# Patient Record
Sex: FEMALE | Race: WHITE | NOT HISPANIC OR LATINO | Employment: FULL TIME | URBAN - METROPOLITAN AREA
[De-identification: names, ages, dates, MRNs, and addresses within clinical notes are randomized per-mention and may not be internally consistent; named-entity substitution may affect disease eponyms.]

---

## 2022-08-07 ENCOUNTER — HOSPITAL ENCOUNTER (EMERGENCY)
Facility: CLINIC | Age: 27
Discharge: HOME | End: 2022-08-07
Attending: EMERGENCY MEDICINE | Admitting: EMERGENCY MEDICINE
Payer: COMMERCIAL

## 2022-08-07 VITALS
WEIGHT: 120 LBS | SYSTOLIC BLOOD PRESSURE: 109 MMHG | RESPIRATION RATE: 18 BRPM | TEMPERATURE: 97.9 F | BODY MASS INDEX: 23.56 KG/M2 | HEIGHT: 60 IN | DIASTOLIC BLOOD PRESSURE: 67 MMHG | OXYGEN SATURATION: 97 % | HEART RATE: 84 BPM

## 2022-08-07 DIAGNOSIS — N75.0 BARTHOLIN'S CYST: ICD-10-CM

## 2022-08-07 DIAGNOSIS — L03.90 CELLULITIS, UNSPECIFIED CELLULITIS SITE: ICD-10-CM

## 2022-08-07 LAB
BASOPHILS # BLD AUTO: 0.1 10E3/UL (ref 0–0.2)
BASOPHILS NFR BLD AUTO: 1 %
EOSINOPHIL # BLD AUTO: 0.2 10E3/UL (ref 0–0.7)
EOSINOPHIL NFR BLD AUTO: 1 %
ERYTHROCYTE [DISTWIDTH] IN BLOOD BY AUTOMATED COUNT: 13.2 % (ref 10–15)
HCT VFR BLD AUTO: 39.1 % (ref 35–47)
HGB BLD-MCNC: 13 G/DL (ref 11.7–15.7)
IMM GRANULOCYTES # BLD: 0.1 10E3/UL
IMM GRANULOCYTES NFR BLD: 1 %
LYMPHOCYTES # BLD AUTO: 1.7 10E3/UL (ref 0.8–5.3)
LYMPHOCYTES NFR BLD AUTO: 16 %
MCH RBC QN AUTO: 29.3 PG (ref 26.5–33)
MCHC RBC AUTO-ENTMCNC: 33.2 G/DL (ref 31.5–36.5)
MCV RBC AUTO: 88 FL (ref 78–100)
MONOCYTES # BLD AUTO: 0.4 10E3/UL (ref 0–1.3)
MONOCYTES NFR BLD AUTO: 4 %
NEUTROPHILS # BLD AUTO: 8.4 10E3/UL (ref 1.6–8.3)
NEUTROPHILS NFR BLD AUTO: 77 %
NRBC # BLD AUTO: 0 10E3/UL
NRBC BLD AUTO-RTO: 0 /100
PLATELET # BLD AUTO: 403 10E3/UL (ref 150–450)
RBC # BLD AUTO: 4.44 10E6/UL (ref 3.8–5.2)
WBC # BLD AUTO: 10.8 10E3/UL (ref 4–11)

## 2022-08-07 PROCEDURE — 36415 COLL VENOUS BLD VENIPUNCTURE: CPT | Performed by: EMERGENCY MEDICINE

## 2022-08-07 PROCEDURE — 56420 I&D BARTHOLINS GLAND ABSCESS: CPT

## 2022-08-07 PROCEDURE — 87070 CULTURE OTHR SPECIMN AEROBIC: CPT | Performed by: EMERGENCY MEDICINE

## 2022-08-07 PROCEDURE — 250N000011 HC RX IP 250 OP 636: Performed by: EMERGENCY MEDICINE

## 2022-08-07 PROCEDURE — 96374 THER/PROPH/DIAG INJ IV PUSH: CPT

## 2022-08-07 PROCEDURE — 87077 CULTURE AEROBIC IDENTIFY: CPT | Mod: 91 | Performed by: EMERGENCY MEDICINE

## 2022-08-07 PROCEDURE — 99285 EMERGENCY DEPT VISIT HI MDM: CPT | Mod: 25

## 2022-08-07 PROCEDURE — 85025 COMPLETE CBC W/AUTO DIFF WBC: CPT | Performed by: EMERGENCY MEDICINE

## 2022-08-07 RX ORDER — FENTANYL CITRATE 50 UG/ML
50 INJECTION, SOLUTION INTRAMUSCULAR; INTRAVENOUS ONCE
Status: COMPLETED | OUTPATIENT
Start: 2022-08-07 | End: 2022-08-07

## 2022-08-07 RX ORDER — CEPHALEXIN 500 MG/1
500 CAPSULE ORAL 4 TIMES DAILY
Qty: 28 CAPSULE | Refills: 0 | Status: SHIPPED | OUTPATIENT
Start: 2022-08-07 | End: 2022-08-14

## 2022-08-07 RX ADMIN — FENTANYL CITRATE 50 MCG: 50 INJECTION, SOLUTION INTRAMUSCULAR; INTRAVENOUS at 20:54

## 2022-08-07 ASSESSMENT — ENCOUNTER SYMPTOMS
NAUSEA: 0
CHILLS: 0
DIAPHORESIS: 0
ABDOMINAL PAIN: 0
DIARRHEA: 0
DYSURIA: 0
VOMITING: 0
FEVER: 0

## 2022-08-08 NOTE — CONSULTS
CONSULTATION - GYN    NAME: Tatyana Akbar   : 1995   MRN: [id number]      [facility]    ADMISSION DATE: 2022    PCP:  No Ref-Primary, Physician     CHIEF COMPLAINT: Vulvar pain/swelling, concern for abscess    HPI: I have been requested by Dr Garcia to evaluate Tatyana Akbar for vulvar swelling and concern for abscess. Patient is a 27 year old female, she presented to the ED for vulvar pain worsening over the past 4 days.  She is able to void and empty her bladder.      She recently moved to this area, had previously followed with an OB/GYN but has not established with one locally.      PAST MEDICAL HISTORY:  No past medical history on file.    PAST SURGICAL HISTORY:  No past surgical history on file.    SOCIAL HISTORY:  Social History     Socioeconomic History     Marital status: Single     Spouse name: Not on file     Number of children: Not on file     Years of education: Not on file     Highest education level: Not on file   Occupational History     Not on file   Tobacco Use     Smoking status: Not on file     Smokeless tobacco: Not on file   Substance and Sexual Activity     Alcohol use: Not on file     Drug use: Not on file     Sexual activity: Not on file   Other Topics Concern     Not on file   Social History Narrative     Not on file     Social Determinants of Health     Financial Resource Strain: Not on file   Food Insecurity: Not on file   Transportation Needs: Not on file   Physical Activity: Not on file   Stress: Not on file   Social Connections: Not on file   Intimate Partner Violence: Not on file   Housing Stability: Not on file       MEDICATIONS:  No current facility-administered medications for this encounter.     Current Outpatient Medications   Medication     cephALEXin (KEFLEX) 500 MG capsule       ALLERGIES:  No Known Allergies    REVIEW OF SYSTEMS    Negative except what is stated in the HPI    PHYSICAL EXAM:  /67   Pulse 84   Temp 97.9  F (36.6  C)  (Temporal)   Resp 18   Ht 1.524 m (5')   Wt 54.4 kg (120 lb)   LMP 08/07/2022   SpO2 97%   BMI 23.44 kg/m     General Appearance: Alert, appropriate appearance for age. No acute distress,   HEENT : Grossly normal  Chest/Respiratory: Normal respirations.   Cardiovascular: Regular rate and rhythm   Pelvic Exam Female: external exam only, right labia is swollen but no area of fluctuance, enlarged area of fluctuance in area of right bartholin gland, exquisitely tender to palpatoin  Skin: no rash or abnormalities,   Neurologic: Normal gait and speech   Psychiatric: Alert and oriented, appropriate affect.    LABS  Lab Results   Component Value Date    HGB 13.0 08/07/2022     Bartholin I&D Procedure:  Exam performed with findings noted above.  Patient verbally agreed to I&D.  Area was swabbed with betadine and injected with 2 ml of local anesthesia, small stab incision made with scalpel with copious drainage of purulent discharge.  Culture was obtained.  Incision was made in an accessible area of the bartholin gland abscess, exam was limited secondary to patient pain.  Incision was adequate for drainage.    IMPRESSION:  27 year old  Female, No obstetric history on file. with Bartholin gland abscess    RECOMMENDATIONS:  -I&D done tonight at bedside.  Incision was adequate to facilitate drainage but exam limited by patient discomfort.  Word catheter not inserted tonight as incision was somewhat lateral and not optimal for word placement.   -Recommended sitz baths at home to encourage drainage.    -Recommended she follow-up with [facility] within the next 2-3 days for re-evaluation.  Discussed we can re-evaluate at that time, if additional I&D indicated and can re-assess for word catheter placement at that time.    -Recommended oral antibiotics given ongoing swelling/induration in the surrounding labia.    -Reviewed warning signs, advised to call clinic if she notices increasing size of the swelling or  worsening pain    Total time 45 minutes including chart review, time with patient and documentation on date of encounter    Thank you for allowing us to participate in the care of this patient.  Please contact us with any questions/concerns.    Sandy Branch MD

## 2022-08-08 NOTE — ED PROVIDER NOTES
EMERGENCY DEPARTMENT ENCOUNTER      NAME: Tatyana Akbar  AGE: 27 year old female  YOB: 1995  MRN: [id number]  EVALUATION DATE & TIME: 8/7/2022  8:14 PM    PCP: No primary care provider on file.    ED PROVIDER: Martha Garcia MD      Chief Complaint   Patient presents with     Vaginal Problem         FINAL IMPRESSION:  1. Bartholin's cyst    2. Cellulitis, unspecified cellulitis site          ED COURSE & MEDICAL DECISION MAKING:    Pertinent Labs & Imaging studies reviewed. (See chart for details)  27 year old female presents to the Emergency Department for evaluation of vaginal swelling and tenderness.  The symptoms started 4 days ago and she has noticed increased swelling and pain to the point where she cannot walk without having pain.  She denies any constitutional symptoms.  She denies any history of skin infections.  On my evaluation, she has an area of fluctuance on the right labia majora, with associated erythema and edema of the right labia majora.  I consulted OB, Dr. Branch was able to come down and see the patient at the bedside and incise and drain the abscess.  She will plan to have the patient follow-up with them in clinic.  We recommended initiating patient on antibiotics.  This was done.  Patient will discharge and follow-up in clinic with OB.  Patient is comfortable with this plan.    8:19PM:I met with the patient to gather history and to perform my initial exam. We discussed plans for the ED course, including diagnostic testing and treatment.   8:35 PM I paged OBGYN  8:40 PM I spoke with OBGYN  9:00 PM OBGYN will drain the cyst.   9:30 PM Dr. Britta Branch from OBGYN is draining the cyst.   9:41 PM I marked the patient ready for discharge.       At the conclusion of the encounter I discussed the results of all of the tests and the disposition. The questions were answered. The patient or family acknowledged understanding and was agreeable with the care plan.       MEDICATIONS  GIVEN IN THE EMERGENCY:  Medications   fentaNYL (PF) (SUBLIMAZE) injection 50 mcg (50 mcg Intravenous Given 8/7/22 2054)       NEW PRESCRIPTIONS STARTED AT TODAY'S ER VISIT  New Prescriptions    CEPHALEXIN (KEFLEX) 500 MG CAPSULE    Take 1 capsule (500 mg) by mouth 4 times daily for 7 days          =================================================================    HPI    Patient information was obtained from: Patient     Use of : N/A        Tatyana Akbar is a 27 year old female with a limited pertinent history who presents to this ED via private vehicle for evaluation of vaginal problem.     Patient reports right sided vaginal swelling since 8/4. On 8/5 the swelling became worse and it was painful to sit. She attempted to take warm baths and compresses with Advil but found little relief. Today, the swelling has increased and it is too painful for her to sleep. She describes the pain as aching and burning. Patient add she did have sexual intercourse on 8/3.     Patient reports she is on her period and is not able to insert a tampon due to swelling. She notes her period does not flow unless she is attempting to use the bathroom.     Of note, patient has a history of epilepsy but has not been on medication for five years. This afternoon as she was trying to get out of the bath she believes she had a syncopal episode or seizure.     Denies fever, chills, sweats, nausea, vomiting, diarrhea, abdominal pain, vaginal bleeding or discharge, and dysuria. Denies history of infection.       REVIEW OF SYSTEMS   Review of Systems   Constitutional: Negative for chills, diaphoresis and fever.   Gastrointestinal: Negative for abdominal pain, diarrhea, nausea and vomiting.   Genitourinary: Negative for decreased urine volume, dysuria and vaginal discharge.        Positive for right sided vaginal swelling.    All other systems reviewed and are negative.       PAST MEDICAL HISTORY:  No past medical history on  file.    PAST SURGICAL HISTORY:  No past surgical history on file.        CURRENT MEDICATIONS:    cephALEXin (KEFLEX) 500 MG capsule        ALLERGIES:  No Known Allergies    FAMILY HISTORY:  No family history on file.    SOCIAL HISTORY:        VITALS:  /67   Pulse 89   Temp 97.9  F (36.6  C) (Temporal)   Resp 18   Ht 1.524 m (5')   Wt 54.4 kg (120 lb)   LMP 08/07/2022   SpO2 97%   BMI 23.44 kg/m      PHYSICAL EXAM    Constitutional: Well developed, Well nourished, NAD  HENT: Normocephalic, Atraumatic, Bilateral external ears normal, Oropharynx normal, mucous membranes moist, Nose normal.   Neck- Normal range of motion, No tenderness, Supple, No stridor.  Eyes: PERRL, EOMI, Conjunctiva normal, No discharge.   Respiratory: Normal breath sounds, No respiratory distress  Cardiovascular: Normal heart rate, Regular rhythm  GI: Bowel sounds normal, Soft, No tenderness,   Musculoskeletal: No edema. Good range of motion in all major joints. No tenderness to palpation or major deformities noted.   Integument: Warm, Dry, No erythema, No rash  : Right labia majora is erythematous, edematous with an area of fluctuance at the posterior aspect.   Neurologic: Alert & oriented x 3, Normal motor function, Normal sensory function, No focal deficits noted. Normal gait.   Psychiatric: Affect normal, Judgment normal, Mood normal.     I, Rach Velazquez, am serving as a scribe to document services personally performed by Martha Garcia, based on my observation and the provider's statements to me. I, Martha Garcia MD, attest that Rach Velazquez is acting in a scribe capacity, has observed my performance of the services and has documented them in accordance with my direction.    Martha Garcia MD  Emergency Medicine  [facility] EMERGENCY ROOM  [address] [address] [phone]       Martha Garcia MD  08/07/22 2985

## 2022-08-08 NOTE — ED TRIAGE NOTES
Pt presents with R labia swelling and 10/10 pain. Pt reports pain started on Thursday. ABCs intact.      Triage Assessment     Row Name 08/07/22 2013       Triage Assessment (Adult)    Airway WDL WDL       Respiratory WDL    Respiratory WDL WDL       Skin Circulation/Temperature WDL    Skin Circulation/Temperature WDL WDL       Cardiac WDL    Cardiac WDL WDL       Peripheral/Neurovascular WDL    Peripheral Neurovascular WDL WDL       Cognitive/Neuro/Behavioral WDL    Cognitive/Neuro/Behavioral WDL WDL

## 2022-08-11 LAB
BACTERIA ABSC ANAEROBE+AEROBE CULT: ABNORMAL

## 2022-10-03 ENCOUNTER — HEALTH MAINTENANCE LETTER (OUTPATIENT)
Age: 27
End: 2022-10-03

## 2023-02-18 ENCOUNTER — HOSPITAL ENCOUNTER (EMERGENCY)
Facility: CLINIC | Age: 28
Discharge: HOME | End: 2023-02-18
Attending: EMERGENCY MEDICINE | Admitting: EMERGENCY MEDICINE
Payer: COMMERCIAL

## 2023-02-18 VITALS
HEART RATE: 102 BPM | BODY MASS INDEX: 23.56 KG/M2 | SYSTOLIC BLOOD PRESSURE: 122 MMHG | DIASTOLIC BLOOD PRESSURE: 77 MMHG | HEIGHT: 60 IN | RESPIRATION RATE: 18 BRPM | WEIGHT: 120 LBS | TEMPERATURE: 97.4 F | OXYGEN SATURATION: 98 %

## 2023-02-18 DIAGNOSIS — N75.1 ABSCESS OF RIGHT BARTHOLIN'S GLAND: ICD-10-CM

## 2023-02-18 PROBLEM — G40.909 EPILEPTIC SEIZURE (H): Status: ACTIVE | Noted: 2023-02-18

## 2023-02-18 PROBLEM — Z86.19 HISTORY OF VARICELLA INFECTION: Status: ACTIVE | Noted: 2023-02-18

## 2023-02-18 PROBLEM — Z97.5 IUD (INTRAUTERINE DEVICE) IN PLACE: Status: ACTIVE | Noted: 2017-08-14

## 2023-02-18 PROBLEM — F33.0 DEPRESSION, MAJOR, RECURRENT, MILD (H): Status: ACTIVE | Noted: 2023-02-18

## 2023-02-18 PROCEDURE — 99283 EMERGENCY DEPT VISIT LOW MDM: CPT

## 2023-02-18 PROCEDURE — 10060 I&D ABSCESS SIMPLE/SINGLE: CPT

## 2023-02-18 PROCEDURE — 250N000013 HC RX MED GY IP 250 OP 250 PS 637: Performed by: EMERGENCY MEDICINE

## 2023-02-18 RX ORDER — CEPHALEXIN 500 MG/1
500 CAPSULE ORAL 4 TIMES DAILY
Qty: 28 CAPSULE | Refills: 0 | Status: SHIPPED | OUTPATIENT
Start: 2023-02-18 | End: 2023-02-25

## 2023-02-18 RX ORDER — CEPHALEXIN 500 MG/1
500 CAPSULE ORAL ONCE
Status: COMPLETED | OUTPATIENT
Start: 2023-02-18 | End: 2023-02-18

## 2023-02-18 RX ADMIN — CEPHALEXIN 500 MG: 500 CAPSULE ORAL at 17:08

## 2023-02-18 ASSESSMENT — ACTIVITIES OF DAILY LIVING (ADL)
ADLS_ACUITY_SCORE: 35
ADLS_ACUITY_SCORE: 33

## 2023-02-18 NOTE — DISCHARGE INSTRUCTIONS
I recommend 600 mg of ibuprofen every 6 hours for the next couple of days, then only as needed.  Make sure you keep up with your Keflex.    Antibiotics take about 24 hours to kick in.  That is when the inflammation and swelling and redness stops spreading.  If you notice that your symptoms are getting worse after 24 hours, please reach out to gynecology or, care for drainage

## 2023-02-18 NOTE — ED PROVIDER NOTES
EMERGENCY DEPARTMENT ENCOUNTER      NAME: Tatyana Akbar  AGE: 27 year old female  YOB: 1995  MRN: [id number]  EVALUATION DATE & TIME: 2/18/2023  2:30 PM    PCP: No Ref-Primary, Physician    ED PROVIDER: Marino Zimmerman M.D.      Chief Complaint   Patient presents with     Cyst         FINAL IMPRESSION:  1. Abscess of right Bartholin's gland          ED COURSE & MEDICAL DECISION MAKING:    Pertinent Labs & Imaging studies reviewed. (See chart for details)  ED Course as of 02/18/23 2224   Sat Feb 18, 2023   1528 Patient is a 27-year-old woman here with pain to the right side of her vagina consistent with prior Bartholin gland cyst infection.  She had successful I&D here couple months ago, also was treated with a course of antibiotics.  Plan to get a female chaperone for full evaluation, possible I&D   1602 2 small incisions were made in the right lower labia at area of maximal fluctuance/edema.  There was some bloody drainage, no obvious purulent output.  Patient tolerated the procedure well.  Given first dose of Keflex here, discharged with remainder, we discussed return precautions.  She has gynecology follow-up for the next couple of weeks.       Additional ED Course Timestamps:  3:25 PM introduced myself to the patient, obtained patient history, performed a physical exam, and discussed plan for ED workup including potential diagnostic laboratory/imaging studies and interventions.    3:50 PM I&D procedure performed with female chaperone present.    4:03 PM Rechecked and updated the patient. We discussed the plan for discharge and the patient is agreeable. Reviewed supportive cares, symptomatic treatment, outpatient follow up, and reasons to return to the Emergency Department. Patient to be discharged by ED RN.     Medical Decision Making    History:    Supplemental history from: N/A    External Record(s) reviewed: Inpatient Record:   and Outpatient Record:      Work Up:    Chart documentation  includes differential considered and any EKGs or imaging independently interpreted by provider, where specified.    In additional to work up documented, I considered the following work up: Documented in chart, if applicable.    External consultation:    Discussion of management with another provider: Documented in chart, if applicable    Complicating factors:    Care impacted by chronic illness: Other: Hypothryoidism    Care affected by social determinants of health: N/A    Disposition considerations: Discharge. I prescribed additional prescription strength medication(s) as charted. N/A.        At the conclusion of the encounter I discussed the results of all of the tests and the disposition. The questions were answered. The patient or family acknowledged understanding and was agreeable with the care plan.       MEDICATIONS GIVEN IN THE EMERGENCY:  Medications   cephALEXin (KEFLEX) capsule 500 mg (500 mg Oral Given 2/18/23 1708)         NEW PRESCRIPTIONS STARTED AT TODAY'S ER VISIT  Discharge Medication List as of 2/18/2023  5:09 PM      START taking these medications    Details   cephALEXin (KEFLEX) 500 MG capsule Take 1 capsule (500 mg) by mouth 4 times daily for 7 days, Disp-28 capsule, R-0, E-Prescribe                =================================================================    HPI    Patient information was obtained from: patient    Use of : N/A      Tatyana Akbar is a 27 year old female with a pertinent history of Bartholin's cyst, cellulitis, and s/p IUD placement who presents to this ED for evaluation of vaginal pain.    Per patient, she was seen here a few months ago for a Bartholin's cyst on her right side. An I&D was performed. She was placed on antibiotics which alleviated her symptoms completely. She did not experience pain.    Today, she again is experiencing right-sided vaginal pain. She believes it is a Bartholin's cyst again.    She denies fever, chills, or skin  problems.    Patient is following with a gynecologist, and she has an appointment scheduled for March; however, the pain became unbearable today, prompting her to come to the ED.    Patient is on medications for hypothyroidism.       REVIEW OF SYSTEMS   Review of Systems   All other systems reviewed and are negative.  See HPI documentation.    VITALS:  /77   Pulse 102   Temp 97.4  F (36.3  C) (Temporal)   Resp 18   Ht 1.524 m (5')   Wt 54.4 kg (120 lb)   SpO2 98%   BMI 23.44 kg/m      PHYSICAL EXAM    Constitutional: Well developed, well nourished. Comfortable appearing.  HENT: Normocephalic, atraumatic, mucous membranes moist, nose normal  Eyes: PERRL, EOMI, conjunctiva normal, no discharge.  Neck- Supple, gross ROM intact.   Respiratory: Normal work of breathing, normal rate, speaks in full sentences  : Edema and tenderness to right lower labia.  Cardiovascular: Normal heart rate  Musculoskeletal: Moving all 4 extremities intentionally and without pain.  Neurologic: Alert & oriented x 3, cranial nerves grossly intact. Normal gross coordination  Psychiatric: Affect normal, cooperative.    PROCEDURES:   PROCEDURE: Incision and Drainage   INDICATIONS: Localized abscess   PROCEDURE PROVIDER: Dr Ramón Zimmerman   SITE: Right lower labia   MEDICATION: 3 mLs of 1% Lidocaine with epinephrine   NOTE: The area was prepped with betadine.  Local anesthetic was injected subcutaneously with anesthesia effects demonstrated prior to proceeding.  The area of maximal fluctuance was opened with a # 11 Blade ([place]) using a small incision.  Some bloody discharge was expressed, but not purulence. A second small incision was made inferiorly with similar results.     COMPLEXITY: Simple    Simple = single, furuncle, paronychia, superficial  Complex = multiple or abscess requiring probing, loculations, packing placement   COMPLICATIONS: Patient tolerated procedure well, without complication         Peg SANDERS  Josie am serving as a scribe to document services personally performed by Dr. Marino Zimmerman based on my observation and the provider's statements to me. I, Marino Zimmerman MD attest that Peg Galindo is acting in a scribe capacity, has observed my performance of the services and has documented them in accordance with my direction.    Marino Zimmerman M.D.  Emergency Medicine  [facility] EMERGENCY ROOM  [address] [id number] [phone]  Dept: [phone]     Marino Zimmerman MD  02/18/23 3306

## 2023-05-31 ENCOUNTER — APPOINTMENT (OUTPATIENT)
Dept: CT IMAGING | Facility: CLINIC | Age: 28
End: 2023-05-31
Attending: EMERGENCY MEDICINE
Payer: COMMERCIAL

## 2023-05-31 ENCOUNTER — HOSPITAL ENCOUNTER (EMERGENCY)
Facility: CLINIC | Age: 28
Discharge: HOME | End: 2023-05-31
Attending: EMERGENCY MEDICINE | Admitting: EMERGENCY MEDICINE
Payer: COMMERCIAL

## 2023-05-31 ENCOUNTER — APPOINTMENT (OUTPATIENT)
Dept: MRI IMAGING | Facility: CLINIC | Age: 28
End: 2023-05-31
Attending: EMERGENCY MEDICINE
Payer: COMMERCIAL

## 2023-05-31 VITALS
WEIGHT: 117.9 LBS | TEMPERATURE: 97.3 F | OXYGEN SATURATION: 97 % | BODY MASS INDEX: 23.15 KG/M2 | DIASTOLIC BLOOD PRESSURE: 62 MMHG | HEART RATE: 69 BPM | HEIGHT: 60 IN | SYSTOLIC BLOOD PRESSURE: 130 MMHG | RESPIRATION RATE: 18 BRPM

## 2023-05-31 DIAGNOSIS — M54.9 ACUTE MIDLINE BACK PAIN, UNSPECIFIED BACK LOCATION: ICD-10-CM

## 2023-05-31 DIAGNOSIS — S09.90XA CLOSED HEAD INJURY, INITIAL ENCOUNTER: ICD-10-CM

## 2023-05-31 DIAGNOSIS — M54.2 NECK PAIN: ICD-10-CM

## 2023-05-31 PROCEDURE — 72131 CT LUMBAR SPINE W/O DYE: CPT

## 2023-05-31 PROCEDURE — 70450 CT HEAD/BRAIN W/O DYE: CPT

## 2023-05-31 PROCEDURE — 99284 EMERGENCY DEPT VISIT MOD MDM: CPT | Mod: 25

## 2023-05-31 PROCEDURE — 72128 CT CHEST SPINE W/O DYE: CPT

## 2023-05-31 PROCEDURE — 72141 MRI NECK SPINE W/O DYE: CPT

## 2023-05-31 PROCEDURE — 250N000013 HC RX MED GY IP 250 OP 250 PS 637: Performed by: EMERGENCY MEDICINE

## 2023-05-31 PROCEDURE — 72125 CT NECK SPINE W/O DYE: CPT

## 2023-05-31 RX ORDER — OXYCODONE AND ACETAMINOPHEN 5; 325 MG/1; MG/1
1 TABLET ORAL ONCE
Status: COMPLETED | OUTPATIENT
Start: 2023-05-31 | End: 2023-05-31

## 2023-05-31 RX ADMIN — OXYCODONE AND ACETAMINOPHEN 1 TABLET: 5; 325 TABLET ORAL at 18:32

## 2023-05-31 ASSESSMENT — ACTIVITIES OF DAILY LIVING (ADL)
ADLS_ACUITY_SCORE: 35
ADLS_ACUITY_SCORE: 35

## 2023-05-31 NOTE — ED TRIAGE NOTES
Arrives to ED with c/o head injury that occurred last night. Pt was at concert when another person fell from [facility] above pt onto pt. +LOC. +c-spine tenderness. C-collar applied in triage. Reports migraine last night which pt took ibuprofen for. Reports shooting pain down spine when turning neck today. C/O burning sensation to posterior neck. Endorses dizziness and foggy vision.      Triage Assessment     Row Name 05/31/23 8507       Triage Assessment (Adult)    Airway WDL WDL       Respiratory WDL    Respiratory WDL WDL       Skin Circulation/Temperature WDL    Skin Circulation/Temperature WDL WDL       Cardiac WDL    Cardiac WDL WDL       Peripheral/Neurovascular WDL    Peripheral Neurovascular WDL WDL       Cognitive/Neuro/Behavioral WDL    Cognitive/Neuro/Behavioral WDL WDL

## 2023-05-31 NOTE — Clinical Note
Tatyana Akbar was seen and treated in our emergency department on 5/31/2023.  She may return to work on 06/05/2023.       If you have any questions or concerns, please don't hesitate to call.      Marcela aLra MD

## 2023-05-31 NOTE — ED PROVIDER NOTES
EMERGENCY DEPARTMENT ENCOUNTER      NAME: Tatyana Akbar  YOB: 1995  MRN: [id number]    FINAL IMPRESSION  1. Closed head injury, initial encounter    2. Neck pain    3. Acute midline back pain, unspecified back location        MEDICAL DECISION MAKING   Pertinent Labs & Imaging studies reviewed. (See chart for details)    Tatyana Akbar is a 27 year old female who presents for evaluation of headache, neck, and back pain.  Patient reports that she was at a concert last night when a patron sitting a few ronnell above her fell forward and landed on her head.  She did lose consciousness and developed a headache and neck pain when she got home but attributed this to her normal migraine and because she was tired, took some Tylenol then fell asleep.  When she woke this morning, she had more severe pain in her head, neck, and mid back.  She has no complaints of pain in her chest, abdomen, or extremities.  She has no associated numbness, tingling, focal weakness of the extremities.  She was feeling well prior to the fall last night and has no chronic medical conditions outside of seizure disorder. Remainder of history and exam, as below.     Cervical collar was placed on arrival in triage.  I considered a broad differential including but not limited to fracture, dislocation, subluxation, ligamentous injury, soft tissue contusion, muscular strain/sprain, intracranial hemorrhage, tension headache, migraine, radiculopathy, closed head injury/concussion, postconcussive syndrome.  Discussed options for work-up and management with patient.  We have agreed on plan for CT head and CT of thoracic, lumbar, and cervical spines.  Will manage pain with p.o. analgesic.      Per my read, CT of head showed no evidence of intracranial hemorrhage or fracture.  CT of cervical, thoracic, and lumbar spines were all negative as well.  I rechecked the patient and reviewed these results.  I did attempt to remove cervical collar  but on doing so, patient reported increasing pain and felt more comfortable with that in place.  With this, I did put cervical collar back on.  Given concern for possible underlying/associated ligamentous injury, we have agreed on plan to proceed with MR of cervical spine as this is where the majority of patient's pain is located.    MR cervical spine showed no acute traumatic injuries.  I rechecked the patient and reviewed these results.  She has been able to ambulate without assistance and has been texting on her phone throughout her stay in the emergency department.  Discomfort did improve with p.o. analgesic.  She felt overall reassured by results.  At this point, I suspect her symptoms are related to muscular strain/sprain and postconcussive syndrome/headache.  Patient was comfortable with plan for discharge and follow-up with primary care provider for recheck.  Given she did feel more comfortable with cervical collar in place, we were able to find her a soft collar that she felt would be more tolerable and helpful at home.  I also recommended Tylenol/Motrin.    Strict return precautions and follow up recommendations were discussed and all questions were answered. Patient endorsed understanding and was in agreement with plan.    I independently reviewed CT  (as noted above), formal radiologist read pending.      Medical Decision Making    History:    Supplemental history from: N/A    External Record(s) reviewed: Outpatient Record: Urgent Care Televisit 5/31/23    Work Up:    Chart documentation includes differential considered and any EKGs or imaging independently interpreted by provider, where specified.    In additional to work up documented, I considered the following work up: Documented in chart, if applicable.    External consultation:    Discussion of management with another provider: Documented in chart, if applicable    Complicating factors:    Care impacted by chronic illness: Other: Epilepsy    Care  affected by social determinants of health: N/A    Disposition considerations: Discharge. I discussed a prescription for Opioids, but deferred after shared decision making discussion.. I considered admission, but discharged patient after significant clinical improvement.          ED COURSE  6:28 PM I introduced myself to the patient, obtained patient history, performed a physical exam, and discussed plan for ED workup including potential diagnostic laboratory/imaging studies and interventions.  6:33 PM Updated patient on current imaging results.  7:11 PM Rechecked the patient. Updated on imaging results and plan for MRI. She is feeling better.  8:23 PM Rechecked the patient.  10:04 PM Rechecked the patient. Updated patient on imaging results. We discussed the plan for discharge and the patient is agreeable. Reviewed supportive cares, symptomatic treatment, outpatient follow up, and reasons to return to the Emergency Department. Patient to be discharged by ED RN.       MEDICATIONS GIVEN IN THE ED  Medications   oxyCODONE-acetaminophen (PERCOCET) 5-325 MG per tablet 1 tablet (1 tablet Oral $Given 5/31/23 4502)       NEW PRESCRIPTIONS STARTED AT TODAY'S VISIT  There are no discharge medications for this patient.         =================================================================    Chief Complaint   Patient presents with     Head Injury         HPI:    Patient information was obtained from: Patient    Use of : N/A    Tatyana Akbar is a 27 year old female who presents for evaluation of headache and neck pain.    The patient reports she was at a concert last night. During the concert another concertgoer fell down over several seats and into the patient. The patient lost consciousness for a few seconds but was otherwise feeling okay initially. She was not evaluated at the concert. After the concert she had some neck and upper back tenderness and a migraine. She took some Tylenol and then went to bed.  This morning when she woke up she had increased neck and back pain. She is unable to move her neck due to the pain. When she moves her neck she gets a shooting pain down her neck. She also endorses some lower back pain and a posterior headache that extends up the back of her head. She has been taking Tylenol for the pain without relief. She denies any pain in her extremities or numbness/tingling. She had some nausea this morning which has since resolved. She denies any shortness of breath or vomiting.    Today she had a telemedicine urgent care visit who recommended she seek ED evaluation.    She is otherwise healthy and denies any other complaints at this time.      RELEVANT HISTORY, MEDICATIONS, & ALLERGIES   Past medical history, surgical history, family history, medications, and allergies reviewed and pertinent noted in HPI.    REVIEW OF SYSTEMS:  A complete review of systems was performed with pertinent positives and negatives noted in the HPI. All other systems negative.     PHYSICAL EXAM:    Vitals: /62   Pulse 69   Temp 97.3  F (36.3  C) (Temporal)   Resp 18   Ht 1.524 m (5')   Wt 53.5 kg (117 lb 14.4 oz)   SpO2 97%   BMI 23.03 kg/m    General: Awake, interactive. No acute distress.  HENT: No bony deformities or tenderness with palpation of facial bones/scalp. No raccoon eyes or baig sign. No epistaxis. Oropharynx clear of obstruction. No malocclusion.  Eyes: Pupils equal, round and reactive bilaterally. EOMs intact.   Neck: C-collar in place.   Cardiovascular: Regular rate and rhythm. Peripheral pulses 2+ bilaterally.  Chest/Lungs: Normal work of breathing. Lung sounds clear and equal throughout, no wheezes or crackles. No chest wall tenderness, crepitus, or external signs of trauma.  Abdomen: Soft, nontender, nondistended.  Back: Tenderness to palpation over thoracic and lumbar spine without clear step-off or deformity.  MSK: No external signs of trauma. Normal ROM of all four extremities.  No tenderness with palpation of major joints or long bones.   Skin: Warm, well-perfused. No lacerations or significant abrasions.   Neuro: A&O x3. Cranial nerves grossly intact. Strength 5/5 with flex/extension at the shoulders, elbows, wrists, hips, knees, and ankles bilaterally. Distal sensation intact to light touch in all four extremities.  Ambulatory without assistance.      RADIOLOGY  MR CERVICAL SPINE W/O CONTRAST   Final Result   IMPRESSION:   1.  Straightening of cervical lordosis. Otherwise unremarkable MRI of cervical spine.         CT Lumbar Spine w/o Contrast   Final Result   CONCLUSION:   THORACIC SPINE CT:   1.  No fracture or subluxation.      LUMBAR SPINE CT:   1.  No fracture or subluxation.         CT Thoracic Spine w/o Contrast   Final Result   CONCLUSION:   THORACIC SPINE CT:   1.  No fracture or subluxation.      LUMBAR SPINE CT:   1.  No fracture or subluxation.         CT Cervical Spine w/o Contrast   Final Result   IMPRESSION:      HEAD CT:   1. No acute intracranial abnormality.      CERVICAL SPINE CT:   1. No acute traumatic injury of the cervical spine.       CT Head w/o Contrast   Final Result   IMPRESSION:      HEAD CT:   1. No acute intracranial abnormality.      CERVICAL SPINE CT:   1. No acute traumatic injury of the cervical spine.           I, Albert Ace, am serving as a scribe to document services personally performed by Dr. Marcela Lara based on my observation and the provider's statements to me. I, Marcela Lara MD attest that Albert Ace is acting in a scribe capacity, has observed my performance of the services and has documented them in accordance with my direction.    Marcela Lara M.D.  Emergency Medicine  [facility] EMERGENCY ROOM  [address] [id number] [phone]  Dept: [phone]     Marcela Lara MD  06/01/23 0013

## 2023-06-01 NOTE — DISCHARGE INSTRUCTIONS
You were seen in the emergency department today for head, neck, and back pain. Your imaging showed no broken bones or damage to your ligaments.     To help with pain:  - Ice the injury for 20 minutes, 3-5 times per day (or up to every 2 hours as needed) for the first 24-72 hours. You can either use an ice pack or plastic bag filled with ice, cover either one with a damp cloth to prevent the cold from burning your skin.   - Wear the cervical collar if it helps to support your neck   - You may take 650-1000mg of Acetaminophen (Tylenol).  Please do not use more than 3000 mg in a 24 hour period. Tylenol is an effective drug when taken at the prescribed dosages but can cause bodily injury including liver damage if taken too often or at too high of dose.  - You can take 600mg of Ibuprofen (Motrin, Advil) by mouth with food every 6-8 hours (no more than 3200mg in 24hrs).    - You can take one or the other every 3 hours while awake (such that each is taken every 6 hours). For example, if you take Tylenol when you get home then you would take ibuprofen 3 hours later followed by another Tylenol dose 3 hours after that. Write down the times you are taking both medications to ensure appropriate time in between doses.    Please return to the Emergency Department if you have uncontrolled/worsening pain, difficulty breathing, numbness, inability to keep food/fluids down, or any other new or concerning symptoms. Otherwise please follow up with your primary doctor for recheck.    Included is some information that you might find informative and useful.    Thank you for choosing [facility]. It was a pleasure taking care of you today!  - Dr. Marcela Lara

## 2023-10-22 ENCOUNTER — HEALTH MAINTENANCE LETTER (OUTPATIENT)
Age: 28
End: 2023-10-22

## 2023-10-31 ENCOUNTER — VIRTUAL VISIT (OUTPATIENT)
Dept: FAMILY MEDICINE | Facility: CLINIC | Age: 28
End: 2023-10-31
Payer: COMMERCIAL

## 2023-10-31 DIAGNOSIS — F41.0 PANIC ATTACK: ICD-10-CM

## 2023-10-31 DIAGNOSIS — F41.1 GENERALIZED ANXIETY DISORDER: Primary | ICD-10-CM

## 2023-10-31 PROCEDURE — 99204 OFFICE O/P NEW MOD 45 MIN: CPT | Mod: 95 | Performed by: PHYSICIAN ASSISTANT

## 2023-10-31 RX ORDER — ALPRAZOLAM 0.5 MG
0.5 TABLET ORAL 3 TIMES DAILY PRN
Qty: 10 TABLET | Refills: 0 | Status: SHIPPED | OUTPATIENT
Start: 2023-10-31 | End: 2024-04-25

## 2023-10-31 RX ORDER — BUSPIRONE HYDROCHLORIDE 5 MG/1
5 TABLET ORAL 2 TIMES DAILY
Qty: 60 TABLET | Refills: 3 | Status: SHIPPED | OUTPATIENT
Start: 2023-10-31 | End: 2023-11-17

## 2023-10-31 ASSESSMENT — ANXIETY QUESTIONNAIRES
8. IF YOU CHECKED OFF ANY PROBLEMS, HOW DIFFICULT HAVE THESE MADE IT FOR YOU TO DO YOUR WORK, TAKE CARE OF THINGS AT HOME, OR GET ALONG WITH OTHER PEOPLE?: EXTREMELY DIFFICULT
6. BECOMING EASILY ANNOYED OR IRRITABLE: NEARLY EVERY DAY
GAD7 TOTAL SCORE: 15
GAD7 TOTAL SCORE: 15
3. WORRYING TOO MUCH ABOUT DIFFERENT THINGS: NEARLY EVERY DAY
1. FEELING NERVOUS, ANXIOUS, OR ON EDGE: NEARLY EVERY DAY
7. FEELING AFRAID AS IF SOMETHING AWFUL MIGHT HAPPEN: NOT AT ALL
5. BEING SO RESTLESS THAT IT IS HARD TO SIT STILL: MORE THAN HALF THE DAYS
4. TROUBLE RELAXING: NEARLY EVERY DAY
IF YOU CHECKED OFF ANY PROBLEMS ON THIS QUESTIONNAIRE, HOW DIFFICULT HAVE THESE PROBLEMS MADE IT FOR YOU TO DO YOUR WORK, TAKE CARE OF THINGS AT HOME, OR GET ALONG WITH OTHER PEOPLE: EXTREMELY DIFFICULT
GAD7 TOTAL SCORE: 15
2. NOT BEING ABLE TO STOP OR CONTROL WORRYING: SEVERAL DAYS
7. FEELING AFRAID AS IF SOMETHING AWFUL MIGHT HAPPEN: NOT AT ALL

## 2023-10-31 ASSESSMENT — PATIENT HEALTH QUESTIONNAIRE - PHQ9
10. IF YOU CHECKED OFF ANY PROBLEMS, HOW DIFFICULT HAVE THESE PROBLEMS MADE IT FOR YOU TO DO YOUR WORK, TAKE CARE OF THINGS AT HOME, OR GET ALONG WITH OTHER PEOPLE: SOMEWHAT DIFFICULT
SUM OF ALL RESPONSES TO PHQ QUESTIONS 1-9: 7
SUM OF ALL RESPONSES TO PHQ QUESTIONS 1-9: 7

## 2023-10-31 ASSESSMENT — ENCOUNTER SYMPTOMS: NERVOUS/ANXIOUS: 1

## 2023-10-31 NOTE — COMMUNITY RESOURCES LIST (ENGLISH)
10/31/2023   [facility]  N/A  For questions about this resource list or additional care needs, please contact your primary care clinic or care manager.  Phone: [phone]   Email: N/A   Address: [address] [place] [id number]   Hours: N/A        Food and Nutrition       Food pantry  1  [employer] Distance: 1.92 miles      In-Person, Delivery, Pickup   [address] [place] [address]  Language: English, Cook Islander  Hours: Mon - Fri 8:45 AM - 11:30 AM , Mon - Fri 1:00 PM - 3:30 PM  Fees: Free   Phone: (425) 555-6305 Email: info@[employer] Website: http://www.[employer].org     2  [employer] Distance: 2.35 miles      In-Person, Delivery, Pickup   [address] [place] [address]  Language: English  Hours: Tue 8:00 AM - 4:00 PM , Wed 5:30 PM - 6:30 PM  Fees: Free   Phone: (238) 629-6523 Email: info@[employer] Website: http://www.[employer].org     SNAP application assistance  3  [facility] - Economic Support [facility] Distance: 4.02 miles      In-Person, Phone/Virtual   [address] [place] [address]  Language: English  Hours: Mon - Fri 8:00 AM - 4:30 PM  Fees: Free   Phone: (921) 812-4496 Email: norma@[facility]. Website: https://www.[facility]./787/Economic-Support     4  [facility] - SNAP Outreach Distance: 5.01 miles      Phone/Virtual   [address] [place] [address]  Language: Armenian, English, Hmong, Sarah, Cook Islander  Hours: Mon - Fri 10:00 AM - 12:00 PM , Mon - Fri 2:00 PM - 4:00 PM  Fees: Free   Phone: (686) 406-1977 Website: https://[employer].org/     Soup kitchen or free meals  5  [facility] - Take 'n Bake Meals Distance: 2.08 miles      Pickup   [address] [place] [address]  Language: English  Hours: Mon 3:00 PM - 8:00 PM , Tue - Fri 5:00 AM - 8:00 PM , Sat 7:00 AM - 2:00 PM  Fees: Free   Phone: (324) 525-7286  Website: https://communityed.[facility].org/     6  [facility] - [employer] Distance: 2.61 miles      In-Person, Pickup   [address] [place] [address]  Language: English  Hours: Mon - Thu 5:00 PM - 6:30 PM  Fees: Free   Phone: (907) 559-1868 Email: office@[facility].[employer] Website: http://[facility].[employer]/          Transportation       Free or low-cost transportation  7  [facility]  Office - [facility] - Gas vouchers and transportation assistance - Free or low-cost transportation Distance: 3.59 miles      In-Person, Phone/Virtual   [address] [place] [address]  Language: English  Hours: Mon - Fri 8:00 AM - 12:00 PM , Mon - Fri 1:00 PM - 4:00 PM  Fees: Free   Phone: (368) 927-3100 Email: steven@[facility] Website: https://[facility]/social-services-office-[place]/     8  [employer] [facility] [employer] Distance: 3.81 miles      In-Person   [address] [place] [address]  Language: English  Hours: Tue 9:00 AM - 2:00 PM  Fees: Self Pay   Phone: (367) 827-3849 Email: info@[employer] Website: http://www.[employer].org/     Transportation to medical appointments  9  [employer] Distance: 4.59 miles      [address] [place] [address]  Language: English, Amharic, Hmong, Argentine, Brazilian  Hours: Mon - Sun 5:00 AM - 10:00 PM  Fees: Insurance, Self Pay   Phone: (197) 156-1292 Email: [employer] Website: https://www.[facility].info/Providers/[employer]/Transportation/1?pos=9     10  [facility] - Non-Emergency Medical Transportation Distance: 6.63 miles      In-Person   [address] [place] [address]  Language: English  Hours: Mon - Fri 8:00 AM - 4:00 PM Appt. Only  Fees: Self Pay   Phone: (934) 990-2928 Website:  http://www.[facility].org/Medical-Services/Emergency-medical-services/Non-emergency-transportation/          Important Numbers & Websites       Emergency Services   911  [facility] Services   311  Poison Control   (308) 904-7592  Suicide Prevention Lifeline   (568) 935-7776 (TALK)  Child Abuse Hotline   (127) 597-4833 (4-A-Child)  Sexual Assault Hotline   (781) 728-1661 (HOPE)  National Runaway Safeline   (387) 832-1213 (RUNAWAY)  All-Options Talkline   (350) 512-3978  Substance Abuse Referral   (285) 308-6338 (HELP)

## 2023-10-31 NOTE — PROGRESS NOTES
"Tyrese is a 28 year old who is being evaluated via a billable video visit.      How would you like to obtain your AVS? MyChart  If the video visit is dropped, the invitation should be resent by: Text to cell phone: [phone]  Will anyone else be joining your video visit? No        Assessment & Plan     -start Buspar 5mg BID  -alprazolam for panic.  Side effects of medication(s) discussed as well as risks/benefits of taking    -recommend therapy, she is looking into this  -follow-up if not helpful    Generalized anxiety disorder  - busPIRone (BUSPAR) 5 MG tablet; Take 1 tablet (5 mg) by mouth 2 times daily    Panic attack  - ALPRAZolam (XANAX) 0.5 MG tablet; Take 1 tablet (0.5 mg) by mouth 3 times daily as needed for anxiety (panic)    Lorri Ibrahim PA-C  [facility]   Tyrese is a 28 year old, presenting for the following health issues:  Anxiety (Pt is here for panic attack.)      10/31/2023    11:03 AM   Additional Questions   Roomed by Akua   Accompanied by self       -long hx of anxiety and depression  -anxiety is at an \"all time high\"  -had a terrible panic attack today, with vomiting    -is only taking hydroxyzine for her panic.  This was working in the past but is no longer  -sleep is difficult  -mood is OK, denies any feelings of hopelessness or sadness    -was on Effexor for 3 years, went off of this a year ago    History of Present Illness       Mental Health Follow-up:  Patient presents to follow-up on Anxiety.    Patient's anxiety since last visit has been:  Bad  The patient is having other symptoms associated with anxiety.  Any significant life events: grief or loss  Patient is feeling anxious or having panic attacks.  Patient has no concerns about alcohol or drug use.    She eats 0-1 servings of fruits and vegetables daily.She consumes 2 sweetened beverage(s) daily.She exercises with enough effort to increase her heart rate 30 to 60 minutes per day.  She " exercises with enough effort to increase her heart rate 3 or less days per week.   She is taking medications regularly.     Review of Systems   Psychiatric/Behavioral:  The patient is nervous/anxious.       Constitutional, HEENT, cardiovascular, pulmonary, gi and gu systems are negative, except as otherwise noted.      Objective           Vitals:  No vitals were obtained today due to virtual visit.    Physical Exam   GENERAL: Healthy, alert and no distress  EYES: Eyes grossly normal to inspection.  No discharge or erythema, or obvious scleral/conjunctival abnormalities.  RESP: No audible wheeze, cough, or visible cyanosis.  No visible retractions or increased work of breathing.    SKIN: Visible skin clear. No significant rash, abnormal pigmentation or lesions.  NEURO: Cranial nerves grossly intact.  Mentation and speech appropriate for age.  PSYCH: Mentation appears normal, affect normal/bright, judgement and insight intact, normal speech and appearance well-groomed.          Video-Visit Details    Type of service:  Video Visit   Video Start Time:  1115am  Video End Time: 1137am    Originating Location (pt. Location): Home    Distant Location (provider location):  On-site  Platform used for Video Visit: Jayson

## 2023-11-10 ENCOUNTER — VIRTUAL VISIT (OUTPATIENT)
Dept: FAMILY MEDICINE | Facility: CLINIC | Age: 28
End: 2023-11-10

## 2023-11-10 DIAGNOSIS — K59.01 SLOW TRANSIT CONSTIPATION: Primary | ICD-10-CM

## 2023-11-10 DIAGNOSIS — F41.1 GENERALIZED ANXIETY DISORDER: ICD-10-CM

## 2023-11-10 PROCEDURE — 99213 OFFICE O/P EST LOW 20 MIN: CPT | Mod: 95

## 2023-11-10 RX ORDER — AMOXICILLIN 250 MG
1 CAPSULE ORAL DAILY PRN
Qty: 20 TABLET | Refills: 0 | Status: SHIPPED | OUTPATIENT
Start: 2023-11-10

## 2023-11-10 RX ORDER — BISACODYL 10 MG
10 SUPPOSITORY, RECTAL RECTAL DAILY PRN
Qty: 3 SUPPOSITORY | Refills: 0 | Status: SHIPPED | OUTPATIENT
Start: 2023-11-10

## 2023-11-10 NOTE — PROGRESS NOTES
Tyrese is a 28 year old who is being evaluated via a billable video visit.      How would you like to obtain your AVS? [email or web address]  If the video visit is dropped, the invitation should be resent by: Other e-mail: [employer]  Will anyone else be joining your video visit? No          Assessment & Plan     1. Slow transit constipation  No bowel movement in the last 6 days. Able to stay hydrated but will have occasional nausea. Unable to perform physical exam due to virtual visit. Will try suppository, stool softener. Continue twice daily Miralax. I did order an abdominal xray that she will try to complete today. Return precautions discussed, including when to seek urgent/emergent care. Verbalized understanding and agrees with plan.   - bisacodyl (DULCOLAX) 10 MG suppository; Place 1 suppository (10 mg) rectally daily as needed for constipation  Dispense: 3 suppository; Refill: 0  - senna-docusate (SENOKOT-S/PERICOLACE) 8.6-50 MG tablet; Take 1 tablet by mouth daily as needed for constipation  Dispense: 20 tablet; Refill: 0  - XR Abdomen 2 Views; Future    2. Generalized anxiety disorder  Improved. Continue daily buspar and alprazolam prn.      COOKIE Dumont CNP [facility]    Subjective   Tyrese is a 28 year old, presenting for the following health issues:  Hospital F/U (I got prescribed buspirone and xanax  on 10/31. Since then I have been extremely sick and constipated. Pain level is at a 8 or 9. Has not had a bowel movement in 1 week.)      11/10/2023    12:14 PM   Additional Questions   Roomed by Heidi BELL       HPI     Been constipated for the past 6 days.  No dietary changes, but did start buspirone and alprazolam for anxiety on 10/31.  Enema last night. Has not had a bowel movement. Associated stomach cramping, occasional bloating. No fever, chills. Nauseated when she eats but has been drinking fluids without issue. Has tried Miralax full capsule twice a day for the last few days.      Works at  in infant room.     Review of Systems   CONSTITUTIONAL: NEGATIVE for fever, chills, change in weight  RESP: NEGATIVE for significant cough or SOB  CV: NEGATIVE for chest pain, palpitations or peripheral edema  GI: constipation  PSYCHIATRIC: NEGATIVE for changes in mood or affect      Objective    Vitals - Patient Reported  Systolic (Patient Reported):  (does bot monitor)  Pain Score: Extreme Pain (8)  Pain Loc: Abdomen      Physical Exam   GENERAL: Healthy, alert and no distress  PSYCH: Mentation appears normal, affect normal/bright, judgement and insight intact, normal speech and appearance well-groomed.            Video-Visit Details    Type of service:  Telephone visit  Originating Location (pt. Location): Home  Distant Location (provider location):  On-site  Platform used for Video Visit: Unable to complete video visit due to technology issues. Telephone visit completed instead.

## 2023-11-10 NOTE — LETTER
November 10, 2023      Tatyana ALLEN Raffy  [address]   [facility] [address]        To Whom It May Concern:    Tatyana Akbar  was seen on 11/10.  Please excuse her until 11/13 due to illness.        Sincerely,        COOKIE Dumont CNP

## 2023-11-17 ENCOUNTER — MYC REFILL (OUTPATIENT)
Dept: FAMILY MEDICINE | Facility: CLINIC | Age: 28
End: 2023-11-17

## 2023-11-17 DIAGNOSIS — F41.1 GENERALIZED ANXIETY DISORDER: ICD-10-CM

## 2023-11-17 RX ORDER — BUSPIRONE HYDROCHLORIDE 5 MG/1
5 TABLET ORAL 2 TIMES DAILY
Qty: 60 TABLET | Refills: 3 | Status: SHIPPED | OUTPATIENT
Start: 2023-11-17 | End: 2024-04-08

## 2023-12-29 ENCOUNTER — TELEPHONE (OUTPATIENT)
Dept: FAMILY MEDICINE | Facility: CLINIC | Age: 28
End: 2023-12-29

## 2023-12-29 NOTE — LETTER
January 2, 2024      Tatyana Akbar  [address]   [facility] [address]        To Whom It May Concern:    Tatyana ALLEN Raffy missed work on 12/29/2023.  Please excuse her from work on this day.          Sincerely,        Lorri Ibrahim PA-C

## 2023-12-29 NOTE — CONFIDENTIAL NOTE
Pls call pt for more details on her absence.  I may be able to write a note but she needs follow up per note from 10/31/23.  Thanks!  Lorri Ibrahim PA-C on 12/29/2023 at 4:24 PM

## 2024-01-02 NOTE — TELEPHONE ENCOUNTER
Called and spoke to pt. She is looking for a work note to excuse her for missing work on Friday 12/29 due to her anxiety. Her anxiety got so bad she was throwing up. Patient will schedule follow up via [employer]. If able to write letter pt would like it sent through [employer]

## 2024-04-08 ENCOUNTER — MYC REFILL (OUTPATIENT)
Dept: FAMILY MEDICINE | Facility: CLINIC | Age: 29
End: 2024-04-08

## 2024-04-08 DIAGNOSIS — F41.1 GENERALIZED ANXIETY DISORDER: ICD-10-CM

## 2024-04-08 RX ORDER — BUSPIRONE HYDROCHLORIDE 5 MG/1
5 TABLET ORAL 2 TIMES DAILY
Qty: 60 TABLET | Refills: 4 | Status: SHIPPED | OUTPATIENT
Start: 2024-04-08

## 2024-04-25 ENCOUNTER — MYC REFILL (OUTPATIENT)
Dept: FAMILY MEDICINE | Facility: CLINIC | Age: 29
End: 2024-04-25

## 2024-04-25 DIAGNOSIS — F41.0 PANIC ATTACK: ICD-10-CM

## 2024-04-25 RX ORDER — ALPRAZOLAM 0.5 MG
0.5 TABLET ORAL 3 TIMES DAILY PRN
Qty: 10 TABLET | Refills: 0 | Status: SHIPPED | OUTPATIENT
Start: 2024-04-25

## 2024-06-24 NOTE — LETTER
Occupational Therapy Note:    Occupational therapy orders received and patient's chart reviewed. Attempted to see patient this AM for occupational therapy evaluation session. Patient CHIO for dialysis. Will follow and re-attempt as schedule permits/patient available. Thank you,    PHILLIP KINSEY, OT    Rehab [facility]eload Tracker       October 31, 2023      Tatyana ALLEN Raffy  [address]   [facility] [address]        To Whom It May Concern:    Tatyana ALLEN Michelleslime  was seen on 10/31/2023.  Please excuse her  until 11/1/2023 due to medical appointment.        Sincerely,        Lorri Ibrahim PA-C

## 2024-12-15 ENCOUNTER — HEALTH MAINTENANCE LETTER (OUTPATIENT)
Age: 29
End: 2024-12-15

## 2025-02-21 ASSESSMENT — ANXIETY QUESTIONNAIRES
7. FEELING AFRAID AS IF SOMETHING AWFUL MIGHT HAPPEN: NOT AT ALL
3. WORRYING TOO MUCH ABOUT DIFFERENT THINGS: NEARLY EVERY DAY
GAD7 TOTAL SCORE: 15
8. IF YOU CHECKED OFF ANY PROBLEMS, HOW DIFFICULT HAVE THESE MADE IT FOR YOU TO DO YOUR WORK, TAKE CARE OF THINGS AT HOME, OR GET ALONG WITH OTHER PEOPLE?: SOMEWHAT DIFFICULT
6. BECOMING EASILY ANNOYED OR IRRITABLE: MORE THAN HALF THE DAYS
2. NOT BEING ABLE TO STOP OR CONTROL WORRYING: NEARLY EVERY DAY
5. BEING SO RESTLESS THAT IT IS HARD TO SIT STILL: SEVERAL DAYS
GAD7 TOTAL SCORE: 15
GAD7 TOTAL SCORE: 15
1. FEELING NERVOUS, ANXIOUS, OR ON EDGE: NEARLY EVERY DAY
4. TROUBLE RELAXING: NEARLY EVERY DAY
IF YOU CHECKED OFF ANY PROBLEMS ON THIS QUESTIONNAIRE, HOW DIFFICULT HAVE THESE PROBLEMS MADE IT FOR YOU TO DO YOUR WORK, TAKE CARE OF THINGS AT HOME, OR GET ALONG WITH OTHER PEOPLE: SOMEWHAT DIFFICULT
7. FEELING AFRAID AS IF SOMETHING AWFUL MIGHT HAPPEN: NOT AT ALL

## 2025-02-26 ENCOUNTER — OFFICE VISIT (OUTPATIENT)
Dept: FAMILY MEDICINE | Facility: CLINIC | Age: 30
End: 2025-02-26
Payer: COMMERCIAL

## 2025-02-26 VITALS
HEIGHT: 63 IN | RESPIRATION RATE: 16 BRPM | HEART RATE: 93 BPM | OXYGEN SATURATION: 98 % | WEIGHT: 119 LBS | TEMPERATURE: 97.5 F | SYSTOLIC BLOOD PRESSURE: 111 MMHG | BODY MASS INDEX: 21.09 KG/M2 | DIASTOLIC BLOOD PRESSURE: 71 MMHG

## 2025-02-26 DIAGNOSIS — R53.83 OTHER FATIGUE: Primary | ICD-10-CM

## 2025-02-26 DIAGNOSIS — F41.0 PANIC ATTACK: ICD-10-CM

## 2025-02-26 DIAGNOSIS — Z11.4 SCREENING FOR HIV (HUMAN IMMUNODEFICIENCY VIRUS): ICD-10-CM

## 2025-02-26 DIAGNOSIS — E03.9 HYPOTHYROIDISM, UNSPECIFIED TYPE: ICD-10-CM

## 2025-02-26 DIAGNOSIS — Z11.59 NEED FOR HEPATITIS C SCREENING TEST: ICD-10-CM

## 2025-02-26 DIAGNOSIS — F41.9 ANXIETY: ICD-10-CM

## 2025-02-26 LAB
ALBUMIN SERPL BCG-MCNC: 4.6 G/DL (ref 3.5–5.2)
ALP SERPL-CCNC: 51 U/L (ref 40–150)
ALT SERPL W P-5'-P-CCNC: 12 U/L (ref 0–50)
ANION GAP SERPL CALCULATED.3IONS-SCNC: 11 MMOL/L (ref 7–15)
AST SERPL W P-5'-P-CCNC: 17 U/L (ref 0–45)
BILIRUB SERPL-MCNC: 0.5 MG/DL
BUN SERPL-MCNC: 8.5 MG/DL (ref 6–20)
CALCIUM SERPL-MCNC: 9.6 MG/DL (ref 8.8–10.4)
CHLORIDE SERPL-SCNC: 102 MMOL/L (ref 98–107)
CREAT SERPL-MCNC: 0.73 MG/DL (ref 0.51–0.95)
EGFRCR SERPLBLD CKD-EPI 2021: >90 ML/MIN/1.73M2
ERYTHROCYTE [DISTWIDTH] IN BLOOD BY AUTOMATED COUNT: 12.6 % (ref 10–15)
FERRITIN SERPL-MCNC: 30 NG/ML (ref 6–175)
GLUCOSE SERPL-MCNC: 82 MG/DL (ref 70–99)
HCO3 SERPL-SCNC: 25 MMOL/L (ref 22–29)
HCT VFR BLD AUTO: 43.2 % (ref 35–47)
HGB BLD-MCNC: 14.5 G/DL (ref 11.7–15.7)
HIV 1+2 AB+HIV1 P24 AG SERPL QL IA: NONREACTIVE
MCH RBC QN AUTO: 29.4 PG (ref 26.5–33)
MCHC RBC AUTO-ENTMCNC: 33.6 G/DL (ref 31.5–36.5)
MCV RBC AUTO: 87 FL (ref 78–100)
PLATELET # BLD AUTO: 419 10E3/UL (ref 150–450)
POTASSIUM SERPL-SCNC: 4.3 MMOL/L (ref 3.4–5.3)
PROT SERPL-MCNC: 7.5 G/DL (ref 6.4–8.3)
RBC # BLD AUTO: 4.94 10E6/UL (ref 3.8–5.2)
SODIUM SERPL-SCNC: 138 MMOL/L (ref 135–145)
T4 FREE SERPL-MCNC: 0.82 NG/DL (ref 0.9–1.7)
TSH SERPL DL<=0.005 MIU/L-ACNC: 33.1 UIU/ML (ref 0.3–4.2)
WBC # BLD AUTO: 5.9 10E3/UL (ref 4–11)

## 2025-02-26 PROCEDURE — 3078F DIAST BP <80 MM HG: CPT | Performed by: PHYSICIAN ASSISTANT

## 2025-02-26 PROCEDURE — 96127 BRIEF EMOTIONAL/BEHAV ASSMT: CPT | Performed by: PHYSICIAN ASSISTANT

## 2025-02-26 PROCEDURE — 85027 COMPLETE CBC AUTOMATED: CPT | Performed by: PHYSICIAN ASSISTANT

## 2025-02-26 PROCEDURE — 84439 ASSAY OF FREE THYROXINE: CPT | Performed by: PHYSICIAN ASSISTANT

## 2025-02-26 PROCEDURE — 82728 ASSAY OF FERRITIN: CPT | Performed by: PHYSICIAN ASSISTANT

## 2025-02-26 PROCEDURE — 87389 HIV-1 AG W/HIV-1&-2 AB AG IA: CPT | Performed by: PHYSICIAN ASSISTANT

## 2025-02-26 PROCEDURE — 36415 COLL VENOUS BLD VENIPUNCTURE: CPT | Performed by: PHYSICIAN ASSISTANT

## 2025-02-26 PROCEDURE — 80053 COMPREHEN METABOLIC PANEL: CPT | Performed by: PHYSICIAN ASSISTANT

## 2025-02-26 PROCEDURE — G2211 COMPLEX E/M VISIT ADD ON: HCPCS | Performed by: PHYSICIAN ASSISTANT

## 2025-02-26 PROCEDURE — 84443 ASSAY THYROID STIM HORMONE: CPT | Performed by: PHYSICIAN ASSISTANT

## 2025-02-26 PROCEDURE — 99214 OFFICE O/P EST MOD 30 MIN: CPT | Performed by: PHYSICIAN ASSISTANT

## 2025-02-26 PROCEDURE — 3074F SYST BP LT 130 MM HG: CPT | Performed by: PHYSICIAN ASSISTANT

## 2025-02-26 RX ORDER — ALPRAZOLAM 0.5 MG
0.5 TABLET ORAL 3 TIMES DAILY PRN
Qty: 10 TABLET | Refills: 0 | Status: SHIPPED | OUTPATIENT
Start: 2025-02-26

## 2025-02-26 RX ORDER — FLUOXETINE 10 MG/1
10 TABLET, FILM COATED ORAL DAILY
Qty: 30 TABLET | Refills: 1 | Status: SHIPPED | OUTPATIENT
Start: 2025-02-26

## 2025-02-26 RX ORDER — HYDROXYZINE HYDROCHLORIDE 25 MG/1
25 TABLET, FILM COATED ORAL EVERY 4 HOURS PRN
Qty: 40 TABLET | Refills: 2 | Status: SHIPPED | OUTPATIENT
Start: 2025-02-26

## 2025-02-26 ASSESSMENT — PATIENT HEALTH QUESTIONNAIRE - PHQ9
10. IF YOU CHECKED OFF ANY PROBLEMS, HOW DIFFICULT HAVE THESE PROBLEMS MADE IT FOR YOU TO DO YOUR WORK, TAKE CARE OF THINGS AT HOME, OR GET ALONG WITH OTHER PEOPLE: SOMEWHAT DIFFICULT
SUM OF ALL RESPONSES TO PHQ QUESTIONS 1-9: 14
SUM OF ALL RESPONSES TO PHQ QUESTIONS 1-9: 14

## 2025-02-26 ASSESSMENT — ENCOUNTER SYMPTOMS: NERVOUS/ANXIOUS: 1

## 2025-02-26 NOTE — PROGRESS NOTES
Assessment & Plan     -labs today to r/o fatigue causes  -start fluoxetine 10mg  -hydroxyzine q4h prn anxiety  -xanax sparingly fo acute severe panic.  Side effects of medication(s) discussed as well as risks/benefits of taking    -follow-up in  3-4 weeks, sooner with concerns.      Other fatigue  - Thyroid peroxidase antibody; Future  - Ferritin; Future  - CBC with platelets; Future  - Comprehensive metabolic panel (BMP + Alb, Alk Phos, ALT, AST, Total. Bili, TP); Future  - Thyroid peroxidase antibody  - Ferritin  - CBC with platelets  - Comprehensive metabolic panel (BMP + Alb, Alk Phos, ALT, AST, Total. Bili, TP)    Anxiety  - hydrOXYzine HCl (ATARAX) 25 MG tablet; Take 1 tablet (25 mg) by mouth every 4 hours as needed for anxiety.  - FLUoxetine (PROZAC) 10 MG tablet; Take 1 tablet (10 mg) by mouth daily.    Panic attack  - ALPRAZolam (XANAX) 0.5 MG tablet; Take 1 tablet (0.5 mg) by mouth 3 times daily as needed for anxiety (panic).  - hydrOXYzine HCl (ATARAX) 25 MG tablet; Take 1 tablet (25 mg) by mouth every 4 hours as needed for anxiety.  - FLUoxetine (PROZAC) 10 MG tablet; Take 1 tablet (10 mg) by mouth daily.    Hypothyroidism, unspecified type  - TSH WITH FREE T4 REFLEX; Future  - TSH WITH FREE T4 REFLEX    Screening for HIV (human immunodeficiency virus)  - HIV Antigen Antibody Combo; Future  - HIV Antigen Antibody Combo    Need for hepatitis C screening test        Zara Ahuja is a 29 year old, presenting for the following health issues:  Anxiety, Depression, and Recheck Medication      2/26/2025     9:39 AM   Additional Questions   Roomed by Akua   Accompanied by self     -increased anxiety lately, worse in the past few months.  H/o anxiety but worse lately.  Dad is in hospice, dying from prostate cancer    -exhausted at the end of the day, 11-13 hours per day, never feels rested  -some mild panic, has had this in the past  -has been prescribed hydroxyzine in the past, worked well.  Has  "lately been using left over xanax.  Helpful   -Buspar made her feel numb, did not like this  -has had trouble with daily meds (I.e. SSRIs) and remembering to take them    -establishing with therapy today    -sister was concerned about her eating.  Will d/w therapy today  Wt Readings from Last 5 Encounters:  02/26/25 : 54 kg (119 lb)  05/31/23 : 53.5 kg (117 lb 14.4 oz)  02/18/23 : 54.4 kg (120 lb)  08/07/22 : 54.4 kg (120 lb)    -h/o hyprothyroidism.  Lost 80-90 lbs a few years ago and stopped her thryoid meds         History of Present Illness       Mental Health Follow-up:  Patient presents to follow-up on Depression & Anxiety.Patient's depression since last visit has been:  Medium  The patient is having other symptoms associated with depression.  Patient's anxiety since last visit has been:  Bad  The patient is having other symptoms associated with anxiety.  Any significant life events: relationship concerns, financial concerns, grief or loss and health concerns  Patient is feeling anxious or having panic attacks.  Patient has no concerns about alcohol or drug use.    Hypothyroidism:     Since last visit, patient describes the following symptoms::  Anxiety, Depression, Dry skin, Fatigue and Weight loss    Weight loss::  6-10 lbs.    She eats 0-1 servings of fruits and vegetables daily.She consumes 4 sweetened beverage(s) daily.She exercises with enough effort to increase her heart rate 30 to 60 minutes per day.  She exercises with enough effort to increase her heart rate 5 days per week.   She is taking medications regularly.         Objective    /71 (BP Location: Right arm, Patient Position: Sitting, Cuff Size: Adult Regular)   Pulse 93   Temp 97.5  F (36.4  C) (Temporal)   Resp 16   Ht 1.59 m (5' 2.6\")   Wt 54 kg (119 lb)   LMP 02/12/2025 (Exact Date)   SpO2 98%   BMI 21.35 kg/m    Body mass index is 21.35 kg/m .  Physical Exam   GENERAL: alert and no distress  NECK: no adenopathy, no asymmetry, " masses, or scars  RESP: lungs clear to auscultation - no rales, rhonchi or wheezes  CV: regular rate and rhythm, normal S1 S2, no S3 or S4, no murmur, click or rub, no peripheral edema  ABDOMEN: soft, nontender, no hepatosplenomegaly, no masses and bowel sounds normal  MS: no gross musculoskeletal defects noted, no edema  PSYCH: mentation appears normal, affect normal/bright         Signed Electronically by: Lorri Ibrahim PA-C

## 2025-04-14 ENCOUNTER — MYC MEDICAL ADVICE (OUTPATIENT)
Dept: FAMILY MEDICINE | Facility: CLINIC | Age: 30
End: 2025-04-14
Payer: COMMERCIAL

## 2025-04-14 NOTE — TELEPHONE ENCOUNTER
Lorri -- please review. Letter pended, sign if appropriate.    Harsha message: I had a small seizure and with my history I know it s pointless to come in after the fact, is it possible to get a doctors note for work for today without having to come in?     Blu FLOYD RN  [facility]

## 2025-04-14 NOTE — LETTER
April 14, 2025      Tatyana Akbar  [address]   [facility] [place] [address]        To Whom It May Concern:    Please excuse her until 4/15/25 due to a flare of a chronic medical condition.        Sincerely,        Lorri Ibrahim PA-C    Electronically signed

## 2025-06-04 ENCOUNTER — MYC REFILL (OUTPATIENT)
Dept: FAMILY MEDICINE | Facility: CLINIC | Age: 30
End: 2025-06-04
Payer: COMMERCIAL

## 2025-06-04 DIAGNOSIS — F41.0 PANIC ATTACK: ICD-10-CM

## 2025-06-04 DIAGNOSIS — E03.9 HYPOTHYROIDISM, UNSPECIFIED TYPE: ICD-10-CM

## 2025-06-04 DIAGNOSIS — F41.9 ANXIETY: ICD-10-CM

## 2025-06-04 RX ORDER — HYDROXYZINE HYDROCHLORIDE 25 MG/1
25 TABLET, FILM COATED ORAL EVERY 4 HOURS PRN
Qty: 40 TABLET | Refills: 2 | Status: SHIPPED | OUTPATIENT
Start: 2025-06-04

## 2025-06-05 RX ORDER — ALPRAZOLAM 0.5 MG
0.5 TABLET ORAL 3 TIMES DAILY PRN
Qty: 10 TABLET | Refills: 0 | Status: SHIPPED | OUTPATIENT
Start: 2025-06-05

## 2025-06-05 RX ORDER — FLUOXETINE 10 MG/1
10 TABLET, FILM COATED ORAL DAILY
Qty: 30 TABLET | Refills: 1 | Status: SHIPPED | OUTPATIENT
Start: 2025-06-05

## 2025-06-05 RX ORDER — LEVOTHYROXINE SODIUM 50 UG/1
50 TABLET ORAL
Qty: 30 TABLET | Refills: 1 | Status: SHIPPED | OUTPATIENT
Start: 2025-06-05